# Patient Record
Sex: MALE | Race: WHITE | NOT HISPANIC OR LATINO | Employment: OTHER | URBAN - METROPOLITAN AREA
[De-identification: names, ages, dates, MRNs, and addresses within clinical notes are randomized per-mention and may not be internally consistent; named-entity substitution may affect disease eponyms.]

---

## 2021-11-07 ENCOUNTER — APPOINTMENT (OUTPATIENT)
Dept: RADIOLOGY | Facility: MEDICAL CENTER | Age: 36
End: 2021-11-07
Attending: EMERGENCY MEDICINE
Payer: OTHER MISCELLANEOUS

## 2021-11-07 ENCOUNTER — HOSPITAL ENCOUNTER (EMERGENCY)
Facility: MEDICAL CENTER | Age: 36
End: 2021-11-07
Attending: EMERGENCY MEDICINE
Payer: OTHER MISCELLANEOUS

## 2021-11-07 ENCOUNTER — APPOINTMENT (OUTPATIENT)
Dept: RADIOLOGY | Facility: MEDICAL CENTER | Age: 36
End: 2021-11-07
Payer: OTHER MISCELLANEOUS

## 2021-11-07 VITALS
SYSTOLIC BLOOD PRESSURE: 139 MMHG | TEMPERATURE: 97.9 F | WEIGHT: 170 LBS | HEIGHT: 67 IN | DIASTOLIC BLOOD PRESSURE: 65 MMHG | RESPIRATION RATE: 18 BRPM | OXYGEN SATURATION: 97 % | HEART RATE: 85 BPM | BODY MASS INDEX: 26.68 KG/M2

## 2021-11-07 DIAGNOSIS — S70.02XA CONTUSION OF LEFT HIP, INITIAL ENCOUNTER: ICD-10-CM
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
## 2021-11-07 DIAGNOSIS — S06.0X9A CONCUSSION WITH LOSS OF CONSCIOUSNESS, INITIAL ENCOUNTER: ICD-10-CM

## 2021-11-07 LAB
ABO GROUP BLD: NORMAL
ALBUMIN SERPL BCP-MCNC: 4.7 G/DL (ref 3.2–4.9)
ALBUMIN/GLOB SERPL: 1.6 G/DL
ALP SERPL-CCNC: 101 U/L (ref 30–99)
ALT SERPL-CCNC: 55 U/L (ref 2–50)
ANION GAP SERPL CALC-SCNC: 12 MMOL/L (ref 7–16)
APTT PPP: 33.1 SEC (ref 24.7–36)
AST SERPL-CCNC: 52 U/L (ref 12–45)
BILIRUB SERPL-MCNC: 0.4 MG/DL (ref 0.1–1.5)
BLD GP AB SCN SERPL QL: NORMAL
BUN SERPL-MCNC: 16 MG/DL (ref 8–22)
CALCIUM SERPL-MCNC: 9.6 MG/DL (ref 8.5–10.5)
CHLORIDE SERPL-SCNC: 102 MMOL/L (ref 96–112)
CO2 SERPL-SCNC: 23 MMOL/L (ref 20–33)
CREAT SERPL-MCNC: 1.19 MG/DL (ref 0.5–1.4)
ERYTHROCYTE [DISTWIDTH] IN BLOOD BY AUTOMATED COUNT: 42.2 FL (ref 35.9–50)
ETHANOL BLD-MCNC: <10.1 MG/DL (ref 0–10)
GLOBULIN SER CALC-MCNC: 3 G/DL (ref 1.9–3.5)
GLUCOSE SERPL-MCNC: 102 MG/DL (ref 65–99)
HCT VFR BLD AUTO: 48.2 % (ref 42–52)
HGB BLD-MCNC: 16.9 G/DL (ref 14–18)
INR PPP: 0.88 (ref 0.87–1.13)
MCH RBC QN AUTO: 32.1 PG (ref 27–33)
MCHC RBC AUTO-ENTMCNC: 35.1 G/DL (ref 33.7–35.3)
MCV RBC AUTO: 91.5 FL (ref 81.4–97.8)
PLATELET # BLD AUTO: 241 K/UL (ref 164–446)
PMV BLD AUTO: 9.1 FL (ref 9–12.9)
POTASSIUM SERPL-SCNC: 4.4 MMOL/L (ref 3.6–5.5)
PROT SERPL-MCNC: 7.7 G/DL (ref 6–8.2)
PROTHROMBIN TIME: 11.7 SEC (ref 12–14.6)
RBC # BLD AUTO: 5.27 M/UL (ref 4.7–6.1)
RH BLD: NORMAL
SODIUM SERPL-SCNC: 137 MMOL/L (ref 135–145)
WBC # BLD AUTO: 14.4 K/UL (ref 4.8–10.8)

## 2021-11-07 PROCEDURE — 85027 COMPLETE CBC AUTOMATED: CPT

## 2021-11-07 PROCEDURE — 85730 THROMBOPLASTIN TIME PARTIAL: CPT

## 2021-11-07 PROCEDURE — 82077 ASSAY SPEC XCP UR&BREATH IA: CPT

## 2021-11-07 PROCEDURE — 86901 BLOOD TYPING SEROLOGIC RH(D): CPT

## 2021-11-07 PROCEDURE — 72125 CT NECK SPINE W/O DYE: CPT

## 2021-11-07 PROCEDURE — 99284 EMERGENCY DEPT VISIT MOD MDM: CPT

## 2021-11-07 PROCEDURE — 307740 HCHG GREEN TRAUMA TEAM SERVICES

## 2021-11-07 PROCEDURE — 86900 BLOOD TYPING SEROLOGIC ABO: CPT

## 2021-11-07 PROCEDURE — 85610 PROTHROMBIN TIME: CPT

## 2021-11-07 PROCEDURE — 86850 RBC ANTIBODY SCREEN: CPT

## 2021-11-07 PROCEDURE — 80053 COMPREHEN METABOLIC PANEL: CPT

## 2021-11-07 PROCEDURE — 73501 X-RAY EXAM HIP UNI 1 VIEW: CPT | Mod: LT

## 2021-11-07 PROCEDURE — 70450 CT HEAD/BRAIN W/O DYE: CPT
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
